# Patient Record
Sex: MALE | Race: WHITE
[De-identification: names, ages, dates, MRNs, and addresses within clinical notes are randomized per-mention and may not be internally consistent; named-entity substitution may affect disease eponyms.]

---

## 2017-01-27 ENCOUNTER — APPOINTMENT (OUTPATIENT)
Dept: UROLOGY | Facility: CLINIC | Age: 67
End: 2017-01-27

## 2017-06-01 ENCOUNTER — MEDICATION RENEWAL (OUTPATIENT)
Age: 67
End: 2017-06-01

## 2017-06-13 ENCOUNTER — MEDICATION RENEWAL (OUTPATIENT)
Age: 67
End: 2017-06-13

## 2017-06-23 ENCOUNTER — APPOINTMENT (OUTPATIENT)
Dept: ORTHOPEDIC SURGERY | Facility: CLINIC | Age: 67
End: 2017-06-23

## 2017-06-23 VITALS
WEIGHT: 192 LBS | BODY MASS INDEX: 30.86 KG/M2 | SYSTOLIC BLOOD PRESSURE: 137 MMHG | HEART RATE: 91 BPM | HEIGHT: 66 IN | DIASTOLIC BLOOD PRESSURE: 95 MMHG

## 2017-06-23 DIAGNOSIS — Z78.9 OTHER SPECIFIED HEALTH STATUS: ICD-10-CM

## 2017-06-23 DIAGNOSIS — M51.36 OTHER INTERVERTEBRAL DISC DEGENERATION, LUMBAR REGION: ICD-10-CM

## 2017-06-23 RX ORDER — PREDNISONE 10 MG/1
10 TABLET ORAL
Qty: 60 | Refills: 0 | Status: ACTIVE | COMMUNITY
Start: 2017-05-02

## 2018-09-04 ENCOUNTER — APPOINTMENT (OUTPATIENT)
Dept: UROLOGY | Facility: CLINIC | Age: 68
End: 2018-09-04
Payer: COMMERCIAL

## 2018-09-04 VITALS
HEART RATE: 93 BPM | WEIGHT: 190 LBS | DIASTOLIC BLOOD PRESSURE: 88 MMHG | SYSTOLIC BLOOD PRESSURE: 137 MMHG | RESPIRATION RATE: 17 BRPM | HEIGHT: 66 IN | BODY MASS INDEX: 30.53 KG/M2 | TEMPERATURE: 97.9 F

## 2018-09-04 PROCEDURE — 99213 OFFICE O/P EST LOW 20 MIN: CPT

## 2018-11-27 ENCOUNTER — MEDICATION RENEWAL (OUTPATIENT)
Age: 68
End: 2018-11-27

## 2018-11-27 RX ORDER — OXYBUTYNIN CHLORIDE 5 MG/1
5 TABLET, EXTENDED RELEASE ORAL
Qty: 90 | Refills: 3 | Status: ACTIVE | COMMUNITY
Start: 2018-09-04 | End: 1900-01-01

## 2019-02-14 ENCOUNTER — TRANSCRIPTION ENCOUNTER (OUTPATIENT)
Age: 69
End: 2019-02-14

## 2019-09-27 ENCOUNTER — APPOINTMENT (OUTPATIENT)
Dept: UROLOGY | Facility: CLINIC | Age: 69
End: 2019-09-27
Payer: MEDICARE

## 2019-09-27 VITALS
HEART RATE: 75 BPM | DIASTOLIC BLOOD PRESSURE: 79 MMHG | SYSTOLIC BLOOD PRESSURE: 125 MMHG | WEIGHT: 177 LBS | TEMPERATURE: 97.9 F | RESPIRATION RATE: 17 BRPM | BODY MASS INDEX: 28.45 KG/M2 | HEIGHT: 66 IN

## 2019-09-27 PROCEDURE — 99214 OFFICE O/P EST MOD 30 MIN: CPT

## 2019-09-27 RX ORDER — MIRABEGRON 25 MG/1
25 TABLET, FILM COATED, EXTENDED RELEASE ORAL
Qty: 30 | Refills: 3 | Status: ACTIVE | COMMUNITY
Start: 2019-09-27 | End: 1900-01-01

## 2019-09-27 NOTE — ASSESSMENT
[FreeTextEntry1] : 68M with a history of frequency and urgency on Ditropan 5 mg XL here for follow-up. \par -Discussed various options with Dr. Soto such as behavioral modification, 1st and 2nd line medications as well as botox and neuromodulation and their various risks and benefits. \par -Myrbetriq 25 mg daily. Will check BP in 2 weeks and follow-up with Dr. Soto in 3-4 months \par

## 2019-09-27 NOTE — PHYSICAL EXAM
[Normal Appearance] : normal appearance [General Appearance - Well Nourished] : well nourished [General Appearance - Well Developed] : well developed [Abdomen Soft] : soft [Abdomen Tenderness] : non-tender [Skin Color & Pigmentation] : normal skin color and pigmentation [Skin Turgor] : supple [] : no respiratory distress [Respiration, Rhythm And Depth] : normal respiratory rhythm and effort [Exaggerated Use Of Accessory Muscles For Inspiration] : no accessory muscle use

## 2019-09-27 NOTE — HISTORY OF PRESENT ILLNESS
[FreeTextEntry1] : 68M with a history of frequency and urgency on Ditropan 5 mg XL here for follow-up. PSA 1.6 (5/2019) from 1.12 last year. Reports ongoing frequency and urgency even on medication. Has been taking it daily for the last 3-4 months. Frequency every 2 hours when weather colder.

## 2019-09-30 ENCOUNTER — MEDICATION RENEWAL (OUTPATIENT)
Age: 69
End: 2019-09-30

## 2019-09-30 RX ORDER — MIRABEGRON 25 MG/1
25 TABLET, FILM COATED, EXTENDED RELEASE ORAL DAILY
Qty: 10 | Refills: 0 | Status: ACTIVE | COMMUNITY
Start: 2019-09-30 | End: 1900-01-01

## 2020-11-11 ENCOUNTER — APPOINTMENT (OUTPATIENT)
Dept: UROLOGY | Facility: CLINIC | Age: 70
End: 2020-11-11
Payer: MEDICARE

## 2020-11-11 VITALS
HEART RATE: 79 BPM | WEIGHT: 178 LBS | BODY MASS INDEX: 28.61 KG/M2 | DIASTOLIC BLOOD PRESSURE: 89 MMHG | HEIGHT: 66 IN | SYSTOLIC BLOOD PRESSURE: 137 MMHG | RESPIRATION RATE: 17 BRPM

## 2020-11-11 VITALS — TEMPERATURE: 97.3 F

## 2020-11-11 PROCEDURE — 99213 OFFICE O/P EST LOW 20 MIN: CPT

## 2020-11-11 NOTE — ASSESSMENT
[FreeTextEntry1] : 70M with a history of frequency and urgency here for follow-up\par -- frequency sx minimal bother\par -- requests Ditropan PRN during days when urgency worse\par -- trend PSA annually\par -- pt to f/u 1 year\par

## 2020-11-11 NOTE — PHYSICAL EXAM
[General Appearance - Well Developed] : well developed [General Appearance - Well Nourished] : well nourished [Abdomen Soft] : soft [Abdomen Tenderness] : non-tender [Prostate Tenderness] : the prostate was not tender [No Prostate Nodules] : no prostate nodules [Prostate Size ___ gm] : prostate size [unfilled] gm [Skin Color & Pigmentation] : normal skin color and pigmentation [Edema] : no peripheral edema [] : no respiratory distress [Affect] : the affect was normal [Normal Station and Gait] : the gait and station were normal for the patient's age [No Focal Deficits] : no focal deficits [No Palpable Adenopathy] : no palpable adenopathy

## 2020-11-11 NOTE — HISTORY OF PRESENT ILLNESS
[FreeTextEntry1] : 70M with a history of frequency and urgency on Ditropan 5 mg XL here for follow-up. PSA 1.6 (5/2019) from 1.12 last year. 1.24 in 2015. Reports ongoing frequency and urgency even on medication. Has been taking it daily for the last 3-4 months. Frequency every 2 hours when weather colder. \par \par Interval hx: Was prescribed Myrbetriq at last visit, however discontinued given cost. Overall frequency and nocturia persists, but minimal bother. \par PSA 6/16/2020: 2.0

## 2022-06-17 ENCOUNTER — APPOINTMENT (OUTPATIENT)
Dept: UROLOGY | Facility: CLINIC | Age: 72
End: 2022-06-17
Payer: MEDICARE

## 2022-06-17 VITALS
DIASTOLIC BLOOD PRESSURE: 71 MMHG | TEMPERATURE: 97.5 F | SYSTOLIC BLOOD PRESSURE: 114 MMHG | RESPIRATION RATE: 16 BRPM | HEART RATE: 82 BPM

## 2022-06-17 DIAGNOSIS — R39.15 URGENCY OF URINATION: ICD-10-CM

## 2022-06-17 DIAGNOSIS — R35.0 FREQUENCY OF MICTURITION: ICD-10-CM

## 2022-06-17 PROCEDURE — 99213 OFFICE O/P EST LOW 20 MIN: CPT

## 2022-06-17 NOTE — HISTORY OF PRESENT ILLNESS
[FreeTextEntry1] : 71 year old with history of LUTS.\par \par Patient has frequency and urgency in the past. He has taken ditropan in past, but reports that he stopped since it wasn't helping. mirbetriq cost prohibitive. He has decreased caffeine intake and feels it has helped things. Daytime now every few hours, 2-3x at night. Patient is happy and does not want to be on medication if he doesn't need. Reports snoring but does not want to be tested for SOBIA.\par \par PSA 6/2021: 2.2, patient getting PSA checked by PCP next week and will send us results.\par \par PVR today 105\par \par + family history of prostate cancer brother and grandfather

## 2022-06-17 NOTE — ASSESSMENT
[FreeTextEntry1] : 71 year old with BPH/LUTS not on medication.\par \par - patient to send us PSA when results\par - will hold on medication at this time, will let us know if any changes\par - declined SOBIA testing\par - f/u in one year

## 2023-06-20 ENCOUNTER — APPOINTMENT (OUTPATIENT)
Dept: UROLOGY | Facility: CLINIC | Age: 73
End: 2023-06-20

## 2023-09-22 ENCOUNTER — APPOINTMENT (OUTPATIENT)
Dept: UROLOGY | Facility: CLINIC | Age: 73
End: 2023-09-22
Payer: MEDICARE

## 2023-09-22 VITALS
WEIGHT: 178 LBS | HEART RATE: 85 BPM | SYSTOLIC BLOOD PRESSURE: 138 MMHG | HEIGHT: 66 IN | TEMPERATURE: 98.2 F | BODY MASS INDEX: 28.61 KG/M2 | DIASTOLIC BLOOD PRESSURE: 89 MMHG | RESPIRATION RATE: 17 BRPM

## 2023-09-22 DIAGNOSIS — N28.1 CYST OF KIDNEY, ACQUIRED: ICD-10-CM

## 2023-09-22 PROCEDURE — 99213 OFFICE O/P EST LOW 20 MIN: CPT

## 2023-09-22 RX ORDER — OXYBUTYNIN CHLORIDE 5 MG/1
5 TABLET, EXTENDED RELEASE ORAL DAILY
Qty: 30 | Refills: 3 | Status: ACTIVE | COMMUNITY
Start: 2023-09-22 | End: 1900-01-01

## 2023-09-28 DIAGNOSIS — N20.0 CALCULUS OF KIDNEY: ICD-10-CM

## 2023-10-08 ENCOUNTER — NON-APPOINTMENT (OUTPATIENT)
Age: 73
End: 2023-10-08

## 2023-10-08 PROBLEM — N20.0 RIGHT RENAL STONE: Status: ACTIVE | Noted: 2023-10-08

## 2024-07-29 ENCOUNTER — NON-APPOINTMENT (OUTPATIENT)
Age: 74
End: 2024-07-29

## 2024-07-30 ENCOUNTER — OUTPATIENT (OUTPATIENT)
Dept: OUTPATIENT SERVICES | Facility: HOSPITAL | Age: 74
LOS: 1 days | End: 2024-07-30

## 2024-07-30 ENCOUNTER — APPOINTMENT (OUTPATIENT)
Dept: UROLOGY | Facility: CLINIC | Age: 74
End: 2024-07-30
Payer: MEDICARE

## 2024-07-30 VITALS
DIASTOLIC BLOOD PRESSURE: 92 MMHG | RESPIRATION RATE: 17 BRPM | HEIGHT: 60 IN | HEART RATE: 84 BPM | WEIGHT: 180 LBS | TEMPERATURE: 97.4 F | BODY MASS INDEX: 35.34 KG/M2 | SYSTOLIC BLOOD PRESSURE: 134 MMHG

## 2024-07-30 DIAGNOSIS — R35.0 FREQUENCY OF MICTURITION: ICD-10-CM

## 2024-07-30 DIAGNOSIS — N28.1 CYST OF KIDNEY, ACQUIRED: ICD-10-CM

## 2024-07-30 DIAGNOSIS — R39.15 URGENCY OF URINATION: ICD-10-CM

## 2024-07-30 DIAGNOSIS — N20.0 CALCULUS OF KIDNEY: ICD-10-CM

## 2024-07-30 PROCEDURE — 76775 US EXAM ABDO BACK WALL LIM: CPT

## 2024-07-30 PROCEDURE — 76775 US EXAM ABDO BACK WALL LIM: CPT | Mod: 26

## 2024-07-30 PROCEDURE — 99213 OFFICE O/P EST LOW 20 MIN: CPT

## 2024-07-30 RX ORDER — MIRABEGRON 50 MG/1
50 TABLET, EXTENDED RELEASE ORAL
Qty: 30 | Refills: 3 | Status: ACTIVE | COMMUNITY
Start: 2024-07-30 | End: 1900-01-01

## 2024-07-30 NOTE — PHYSICAL EXAM
[Normal Appearance] : normal appearance [Well Groomed] : well groomed [General Appearance - In No Acute Distress] : no acute distress [Edema] : no peripheral edema [Respiration, Rhythm And Depth] : normal respiratory rhythm and effort [Exaggerated Use Of Accessory Muscles For Inspiration] : no accessory muscle use [Abdomen Soft] : soft [Abdomen Tenderness] : non-tender [Costovertebral Angle Tenderness] : no ~M costovertebral angle tenderness [Urinary Bladder Findings] : the bladder was normal on palpation [Normal Station and Gait] : the gait and station were normal for the patient's age [] : no rash [No Focal Deficits] : no focal deficits [Oriented To Time, Place, And Person] : oriented to person, place, and time [Affect] : the affect was normal [Mood] : the mood was normal [No Palpable Adenopathy] : no palpable adenopathy [de-identified] : PVR 48cc

## 2024-07-30 NOTE — ASSESSMENT
[FreeTextEntry1] : PVR today 48cc Discussed trying Myrbetriq as it is possibly now cheaper. However $444 for 30 day supply. Will try ditropan again (did not take consistently prior) Renal US today to eval renal stone and parapelvic cysts

## 2024-07-30 NOTE — HISTORY OF PRESENT ILLNESS
[FreeTextEntry1] : Hx R lower pole renal stone . hx of cancer of esophagus  6 months ago.  also with LUTS.  prostate cancer in father and brother.  cr 0.75 ua negative. psa 2.94 7/24 psa last year 2.6 complains of frequency, and urgency (no incontience), worse during last year nocturia 2-3x/night  cut back coffee 1 cup/day from 4-5. 2 cups green tea  Tried ditropan (didn't help), Myrbetriq (expensive). Says he doesn't like taking medicine, wife said it helped but he wouldn't take it.  MR 2023 b/l parapelvic cysts. CT 5 mm intrarenal stone.  Reports PET 6 mo ago  Denies hematuria, dysuria.  Denies allergies to medication.  Takes pantoprazole and famotadine.

## 2024-08-05 DIAGNOSIS — R35.0 FREQUENCY OF MICTURITION: ICD-10-CM

## 2024-09-24 ENCOUNTER — APPOINTMENT (OUTPATIENT)
Dept: UROLOGY | Facility: CLINIC | Age: 74
End: 2024-09-24
Payer: MEDICARE

## 2024-09-24 VITALS
HEART RATE: 51 BPM | WEIGHT: 182 LBS | SYSTOLIC BLOOD PRESSURE: 138 MMHG | TEMPERATURE: 98.2 F | RESPIRATION RATE: 17 BRPM | DIASTOLIC BLOOD PRESSURE: 82 MMHG | BODY MASS INDEX: 28.56 KG/M2 | HEIGHT: 67 IN

## 2024-09-24 DIAGNOSIS — R39.15 URGENCY OF URINATION: ICD-10-CM

## 2024-09-24 DIAGNOSIS — R97.20 ELEVATED PROSTATE, SPECIFIC ANTIGEN [PSA]: ICD-10-CM

## 2024-09-24 DIAGNOSIS — R35.0 FREQUENCY OF MICTURITION: ICD-10-CM

## 2024-09-24 PROCEDURE — G2211 COMPLEX E/M VISIT ADD ON: CPT

## 2024-09-24 PROCEDURE — 51798 US URINE CAPACITY MEASURE: CPT

## 2024-09-24 PROCEDURE — 99214 OFFICE O/P EST MOD 30 MIN: CPT

## 2024-09-24 NOTE — HISTORY OF PRESENT ILLNESS
[None] : no symptoms [FreeTextEntry1] : Urinary frequency and urgency Tried oxybutinin but wasn't taking daily Now taking daily  He is now taking it on a daily basis  PVR is 75cc

## 2024-09-24 NOTE — ASSESSMENT
[FreeTextEntry1] : He feels that the medication is helping He now can walk 2 miles without having to void  Nocturia is improved He has side effects are dry mouth  Will try Mirbetric  due to side effect and increase PVR  Repeat PSA  PVR in 6 months  Psa was 3.0 and repeat was 3.9 Will repeat PSA on Thursday If elevated will do MRI  Follow up in 6 months PVR

## 2024-10-15 ENCOUNTER — APPOINTMENT (OUTPATIENT)
Dept: ENDOCRINOLOGY | Facility: CLINIC | Age: 74
End: 2024-10-15
Payer: MEDICARE

## 2024-10-15 VITALS
HEART RATE: 71 BPM | HEIGHT: 67 IN | DIASTOLIC BLOOD PRESSURE: 86 MMHG | RESPIRATION RATE: 17 BRPM | BODY MASS INDEX: 29.03 KG/M2 | WEIGHT: 185 LBS | OXYGEN SATURATION: 96 % | SYSTOLIC BLOOD PRESSURE: 130 MMHG | TEMPERATURE: 98 F

## 2024-10-15 DIAGNOSIS — Z00.00 ENCOUNTER FOR GENERAL ADULT MEDICAL EXAMINATION W/OUT ABNORMAL FINDINGS: ICD-10-CM

## 2024-10-15 DIAGNOSIS — E11.9 TYPE 2 DIABETES MELLITUS W/OUT COMPLICATIONS: ICD-10-CM

## 2024-10-15 LAB — GLUCOSE BLDC GLUCOMTR-MCNC: 116

## 2024-10-15 PROCEDURE — 82962 GLUCOSE BLOOD TEST: CPT

## 2024-10-15 PROCEDURE — 99204 OFFICE O/P NEW MOD 45 MIN: CPT

## 2024-10-15 NOTE — ASSESSMENT
[FreeTextEntry1] : T2DM relatively well controlled with diet and exercise, not at goal -VIRIDIANA PRO today  -Blood work 2-3 weeks In order to remain off medications: Goal A1c < 6% LDL/HDL ratio less than 2.   -Stop eating at 8pm, exercise 30 minutes after you stop eating for the evening,  -If eating sweets, cut portion in half   HLD -LDL/HDL currently > 2 -Repeat lipid panel next visit    rtc 2-3 weeks  Diabetes Counseling: The patient was counseled on diabetes foot care, long term vascular complications of diabetes, carbohydrate consistent diet, importance of diet and exercise to improve glycemic control, achieve weight loss and improve cardiovascular health, exercise/effect on glucose, hypoglycemia management, glucagon use, ketone testing, action and use of short and long-acting insulin, self-monitoring of blood glucose, insulin self-administration, injection technique, storage, and sharps disposal and sick-day management.  Patient was referred to ophthalmology for retinopathy screening. Risks and benefits of diabetic medications were discussed.

## 2024-10-15 NOTE — HISTORY OF PRESENT ILLNESS
[FreeTextEntry1] : HISTORY OF PRESENT ILLNESS ***Oct 15, 2024*** Mr. HAIR was diagnosed with T2DM in 2019 with an a1c of 6.7% he then changed his diet and implemented an exercise regimen and reduced his A1c to 6.1%, he arrives today with an A1c of 6.2% taken in august.    He reports history of HTN, dyslipidemia, and denies CAD, known complications of retinopathy, nephropathy, or neuropathy. He is presently on nebivolol 10mg qd, pantoprazol, oxybutinin.  He has a strong preference to avoid medications.   Blood sugars are checked inconsistently.  He does report FBG can be as high as 150 mg/dL, but lower later i nthe day   Hypoglycemia frequency:Pt denies subjective HYPOs   Fingerstick glucose in the office today is 116 mg/dL  4-5 hours after eating.   Diet: not following ADA, reports the following typical daily routine: Wakes up at 7a. exercise, takes long walk Breakfast usually consists of eggs, coffee with toast and kielbassa, sandwich with lox, oatmeal with milk.  Lunch usually consists of  salad with meat or fish with tea, also eats a sweet cheese with preservative.  Dinner is at  6pm and usually consists of  Dinner "starts at 6 and ends at midnight. " he says jokingly.  He clarifies that after dinner he eats apples, chocolate, kielbassa, milk and cookies. Before bed he watches tv and goes to sleep around 10-11pm. Denies sugary drinks also endorses he doesn't drink enough water.   Exercise: walk 5 miles everyday after a light strength training routine every morning   Lab review: a1c- 6.2%   Last dilated eye -  Last podiatry visit  -  Last cardiology evaluation -  Last stress test -  Last 2-D Echo -  Last nephrology evaluation -  Last neurology evaluation-

## 2024-11-13 ENCOUNTER — APPOINTMENT (OUTPATIENT)
Dept: ENDOCRINOLOGY | Facility: CLINIC | Age: 74
End: 2024-11-13
Payer: MEDICARE

## 2024-11-13 VITALS
OXYGEN SATURATION: 98 % | WEIGHT: 189 LBS | DIASTOLIC BLOOD PRESSURE: 72 MMHG | SYSTOLIC BLOOD PRESSURE: 122 MMHG | BODY MASS INDEX: 29.66 KG/M2 | HEART RATE: 62 BPM | TEMPERATURE: 97.4 F | HEIGHT: 67 IN | RESPIRATION RATE: 17 BRPM

## 2024-11-13 DIAGNOSIS — E11.9 TYPE 2 DIABETES MELLITUS W/OUT COMPLICATIONS: ICD-10-CM

## 2024-11-13 LAB — GLUCOSE BLDC GLUCOMTR-MCNC: 114

## 2024-11-13 PROCEDURE — 82962 GLUCOSE BLOOD TEST: CPT

## 2024-11-13 PROCEDURE — 99214 OFFICE O/P EST MOD 30 MIN: CPT

## 2024-11-13 NOTE — HISTORY OF PRESENT ILLNESS
[FreeTextEntry1] : ***Nov. 13, 2024*** Shaun feels well overall denies new complaints he is presently not on any medications , controls his diet and exercises daily.  SInce our meeting he has stopped eating past 8pm and goes walking after dinner.   bg 114 mg/dL fasting HISTORY OF PRESENT ILLNESS ***Oct 15, 2024*** Mr. HAIR was diagnosed with T2DM in 2019 with an a1c of 6.7% he then changed his diet and implemented an exercise regimen and reduced his A1c to 6.1%, he arrives today with an A1c of 6.2% taken in august.    He reports history of HTN, dyslipidemia, and denies CAD, known complications of retinopathy, nephropathy, or neuropathy. He is presently on nebivolol 10mg qd, pantoprazol, oxybutinin.  He has a strong preference to avoid medications.   Blood sugars are checked inconsistently.  He does report FBG can be as high as 150 mg/dL, but lower later in the day   Hypoglycemia frequency:Pt denies subjective HYPOs   Fingerstick glucose in the office today is 116 mg/dL  4-5 hours after eating.   Diet: not following ADA, reports the following typical daily routine: Wakes up at 7a. exercise, takes long walk Breakfast usually consists of eggs, coffee with toast and kielbassa, sandwich with lox, oatmeal with milk.  Lunch usually consists of  salad with meat or fish with tea, also eats a sweet cheese with preservative.  Dinner is at  6pm and usually consists of  Dinner "starts at 6 and ends at midnight. " he says jokingly.  He clarifies that after dinner he eats apples, chocolate, kielbassa, milk and cookies. Before bed he watches tv and goes to sleep around 10-11pm. Denies sugary drinks also endorses he doesn't drink enough water.   Exercise: walk 5 miles everyday after a light strength training routine every morning   Lab review: a1c- 6.2%   Last dilated eye -  Last podiatry visit  -  Last cardiology evaluation -  Last stress test -  Last 2-D Echo -  Last nephrology evaluation -  Last neurology evaluation-

## 2024-11-13 NOTE — ASSESSMENT
[FreeTextEntry1] : T2DM relatively well controlled with diet and exercise, not at goal -Blood work 2-3 weeks In order to remain off medications: Goal A1c < 6% LDL/HDL ratio less than 2.   -Continue lifestyle changes Stop eating at 8pm, exercise 30 minutes after you stop eating for the evening,  -If eating sweets, cut portion in half   HLD -LDL/HDL currently > 2 -lipid panel today   RTC 6 months  Diabetes Counseling: The patient was counseled on diabetes foot care, long term vascular complications of diabetes, carbohydrate consistent diet, importance of diet and exercise to improve glycemic control, achieve weight loss and improve cardiovascular health, exercise/effect on glucose, hypoglycemia management, glucagon use, ketone testing, action and use of short and long-acting insulin, self-monitoring of blood glucose, insulin self-administration, injection technique, storage, and sharps disposal and sick-day management.  Patient was referred to ophthalmology for retinopathy screening. Risks and benefits of diabetic medications were discussed.

## 2024-11-14 ENCOUNTER — TRANSCRIPTION ENCOUNTER (OUTPATIENT)
Age: 74
End: 2024-11-14

## 2024-11-14 LAB
25(OH)D3 SERPL-MCNC: 29.6 NG/ML
ALBUMIN SERPL ELPH-MCNC: 4.5 G/DL
ALP BLD-CCNC: 59 U/L
ALT SERPL-CCNC: 28 U/L
ANION GAP SERPL CALC-SCNC: 12 MMOL/L
AST SERPL-CCNC: 31 U/L
BASOPHILS # BLD AUTO: 0.02 K/UL
BASOPHILS NFR BLD AUTO: 0.4 %
BILIRUB SERPL-MCNC: 0.6 MG/DL
BUN SERPL-MCNC: 20 MG/DL
CALCIUM SERPL-MCNC: 9.7 MG/DL
CHLORIDE SERPL-SCNC: 102 MMOL/L
CHOLEST SERPL-MCNC: 181 MG/DL
CO2 SERPL-SCNC: 28 MMOL/L
CREAT SERPL-MCNC: 0.75 MG/DL
CREAT SPEC-SCNC: 150 MG/DL
EGFR: 94 ML/MIN/1.73M2
EOSINOPHIL # BLD AUTO: 0.13 K/UL
EOSINOPHIL NFR BLD AUTO: 2.5 %
ESTIMATED AVERAGE GLUCOSE: 137 MG/DL
FOLATE SERPL-MCNC: >20 NG/ML
FRUCTOSAMINE SERPL-MCNC: 267 UMOL/L
GLUCOSE SERPL-MCNC: 122 MG/DL
GLYCOMARK.: 14 UG/ML
HBA1C MFR BLD HPLC: 6.4 %
HCT VFR BLD CALC: 47.5 %
HDLC SERPL-MCNC: 54 MG/DL
HGB BLD-MCNC: 15.3 G/DL
IMM GRANULOCYTES NFR BLD AUTO: 0.4 %
LDLC SERPL CALC-MCNC: 112 MG/DL
LYMPHOCYTES # BLD AUTO: 2.04 K/UL
LYMPHOCYTES NFR BLD AUTO: 39.1 %
MAN DIFF?: NORMAL
MCHC RBC-ENTMCNC: 29.5 PG
MCHC RBC-ENTMCNC: 32.2 G/DL
MCV RBC AUTO: 91.5 FL
MICROALBUMIN 24H UR DL<=1MG/L-MCNC: <1.2 MG/DL
MICROALBUMIN/CREAT 24H UR-RTO: NORMAL MG/G
MONOCYTES # BLD AUTO: 0.36 K/UL
MONOCYTES NFR BLD AUTO: 6.9 %
NEUTROPHILS # BLD AUTO: 2.65 K/UL
NEUTROPHILS NFR BLD AUTO: 50.7 %
NONHDLC SERPL-MCNC: 127 MG/DL
PLATELET # BLD AUTO: 210 K/UL
POTASSIUM SERPL-SCNC: 4.8 MMOL/L
PROT SERPL-MCNC: 7 G/DL
RBC # BLD: 5.19 M/UL
RBC # FLD: 13.1 %
SODIUM SERPL-SCNC: 142 MMOL/L
T4 FREE SERPL-MCNC: 1.3 NG/DL
TRIGL SERPL-MCNC: 82 MG/DL
TSH SERPL-ACNC: 1.65 UIU/ML
VIT B12 SERPL-MCNC: 511 PG/ML
WBC # FLD AUTO: 5.22 K/UL

## 2024-11-19 ENCOUNTER — TRANSCRIPTION ENCOUNTER (OUTPATIENT)
Age: 74
End: 2024-11-19

## 2024-11-26 ENCOUNTER — TRANSCRIPTION ENCOUNTER (OUTPATIENT)
Age: 74
End: 2024-11-26

## 2025-03-11 ENCOUNTER — APPOINTMENT (OUTPATIENT)
Dept: UROLOGY | Facility: CLINIC | Age: 75
End: 2025-03-11

## 2025-07-28 ENCOUNTER — NON-APPOINTMENT (OUTPATIENT)
Age: 75
End: 2025-07-28

## 2025-07-29 ENCOUNTER — APPOINTMENT (OUTPATIENT)
Dept: UROLOGY | Facility: CLINIC | Age: 75
End: 2025-07-29
Payer: MEDICARE

## 2025-07-29 VITALS
DIASTOLIC BLOOD PRESSURE: 86 MMHG | HEART RATE: 67 BPM | BODY MASS INDEX: 2.98 KG/M2 | TEMPERATURE: 98.4 F | WEIGHT: 19 LBS | HEIGHT: 67 IN | RESPIRATION RATE: 17 BRPM | SYSTOLIC BLOOD PRESSURE: 135 MMHG

## 2025-07-29 DIAGNOSIS — N13.8 BENIGN PROSTATIC HYPERPLASIA WITH LOWER URINARY TRACT SYMPMS: ICD-10-CM

## 2025-07-29 DIAGNOSIS — R35.0 FREQUENCY OF MICTURITION: ICD-10-CM

## 2025-07-29 DIAGNOSIS — N40.1 BENIGN PROSTATIC HYPERPLASIA WITH LOWER URINARY TRACT SYMPMS: ICD-10-CM

## 2025-07-29 PROCEDURE — 99214 OFFICE O/P EST MOD 30 MIN: CPT

## 2025-07-29 RX ORDER — VIBEGRON 75 MG/1
75 TABLET, FILM COATED ORAL
Qty: 90 | Refills: 3 | Status: ACTIVE | COMMUNITY
Start: 2025-07-29 | End: 1900-01-01

## 2025-07-29 NOTE — HISTORY OF PRESENT ILLNESS
[Urinary Frequency] : urinary frequency [FreeTextEntry1] : 75 yo male w/ OAB presenting for follow up.  He was having urinary frequency and urgency. Previously on oxybutynin but switched to Myrbetriq last September. PVR 75 mL during last visit.  PSA 3.00 in September 2024.

## 2025-07-29 NOTE — ASSESSMENT
[FreeTextEntry1] : He was doing well on Mirbegron but it was too expensive  Will change to Gemtesa  He was interested in Botox  He will try Gemtesa  He has spinal stenosis and that may increase his risk of frequemce PSA is 3.8 Will repeat in 1 year If elevated will do MRI

## 2025-09-04 ENCOUNTER — NON-APPOINTMENT (OUTPATIENT)
Age: 75
End: 2025-09-04